# Patient Record
Sex: MALE | Race: WHITE | URBAN - METROPOLITAN AREA
[De-identification: names, ages, dates, MRNs, and addresses within clinical notes are randomized per-mention and may not be internally consistent; named-entity substitution may affect disease eponyms.]

---

## 2020-12-31 ENCOUNTER — HISTORICAL (OUTPATIENT)
Dept: ADMINISTRATIVE | Facility: HOSPITAL | Age: 25
End: 2020-12-31

## 2021-01-05 ENCOUNTER — HISTORICAL (OUTPATIENT)
Dept: ADMINISTRATIVE | Facility: HOSPITAL | Age: 26
End: 2021-01-05

## 2021-02-04 ENCOUNTER — HISTORICAL (OUTPATIENT)
Dept: ADMINISTRATIVE | Facility: HOSPITAL | Age: 26
End: 2021-02-04

## 2021-04-01 ENCOUNTER — HISTORICAL (OUTPATIENT)
Dept: ADMINISTRATIVE | Facility: HOSPITAL | Age: 26
End: 2021-04-01

## 2022-04-10 ENCOUNTER — HISTORICAL (OUTPATIENT)
Dept: ADMINISTRATIVE | Facility: HOSPITAL | Age: 27
End: 2022-04-10

## 2022-04-27 VITALS
WEIGHT: 286.63 LBS | BODY MASS INDEX: 41.03 KG/M2 | SYSTOLIC BLOOD PRESSURE: 140 MMHG | DIASTOLIC BLOOD PRESSURE: 82 MMHG | HEIGHT: 70 IN

## 2022-05-03 NOTE — HISTORICAL [facility]
This is a historical note converted from Harmeet. Formatting and pictures may have been removed.  Please reference Harmeet for original formatting and attached multimedia. Chief Complaint  7 wk f/u ORIF L & R nonop posterior column acetabulum fx 11.9.20, pt states he is doing better, requesting a refill on muscle relaxer, xr today, no other changes  History of Present Illness  ?  Here today for follow-up evaluation status post open reduction internal fixation left posterior column acetabulum fracture as well as closed treatment of right posterior wall acetabulum fracture.?Hes been doing very well, he has been compliant with his nonweightbearing status that he does state on occasion he does stand with his legs?to put on his pants?and has had no ambulation.?He has hinged elbow brace the left elbow?has been working on range of motion exercises and doing very well,?he will follow-up with Dr. Lucien Chacon?next week.  ?  Review of Systems  ?  Otherwise negative  ?  Physical Exam  Vitals & Measurements  T:?36.6? ?C (Oral)? HR:?99(Peripheral)? BP:?138/100?  HT:?177.00?cm? WT:?130.000?kg? BMI:?41.5?  ?  Bilateral lower extremity: No pain with internal/external rotation of the hips.?5 out of 5 motor strength distally?with dorsiflexion and plantar flexion of the ankle. Sensation light touch intact. No calf swelling or tenderness, no signs of DVT bilaterally. Left hip incision?clean dry and intact.  ?  Assessment/Plan  1.?Closed posterior wall fracture of right acetabulum?S32.424D  Ordered:  Clinic Follow up, *Est. 02/11/21 3:00:00 CST, Order for future visit, Closed posterior wall fracture of right acetabulum  Displaced fracture of posterior column [ilioischial] of left acetabulum, initial encounter for closed fracture, [facility]  Post-Op follow-up visit [id number] PC, Closed posterior wall fracture of right acetabulum  Displaced fracture of posterior column [ilioischial] of left acetabulum, initial encounter for closed fracture,  [facility], 12/31/20 10:14:00 CST  XR Pelvis Minimum 3 Views, Routine, *Est. 02/04/21 3:00:00 CST, Trauma, None, Ambulatory, Rad Type, Order for future visit, Closed posterior wall fracture of right acetabulum  Displaced fracture of posterior column [ilioischial] of left acetabulum, initial encounter for closed...  ?  2.?Displaced fracture of posterior column [ilioischial] of left acetabulum, initial encounter for closed fracture?S32.422D  Ordered:  Clinic Follow up, *Est. 02/11/21 3:00:00 CST, Order for future visit, Closed posterior wall fracture of right acetabulum  Displaced fracture of posterior column [ilioischial] of left acetabulum, [facility]  Post-Op follow-up visit [id number] PC, Closed posterior wall fracture of right acetabulum  Displaced fracture of posterior column [ilioischial] of left acetabulum, initial encounter for closed fracture, [facility], 12/31/20 10:14:00 CST  XR Pelvis Minimum 3 Views, Routine, *Est. 02/04/21 3:00:00 CST, Trauma, None, Ambulatory, Rad Type, Order for future visit, Closed posterior wall fracture of right acetabulum  Displaced fracture of posterior column [ilioischial] of left acetabulum, initial encounter for closed...  ?  ?  ?  He is doing very well today and Im very happy with the progress that hes made.?I will allow him to begin standing to transfer?starting in 1 week. Like to see him back in 5 weeks for repeat x-rays. He follows up with Dr. Lucien Chacon next week?for his left elbow dislocation. He understands and agrees with all we have discussed and all questions and concerns were addressed today.?Upon his return to see me in 5 weeks we will allow him?to progress to weightbearing as tolerated?and begin working on?gait training?and strengthening.  ?  ?  Referrals  Clinic Follow up, *Est. 02/11/21 3:00:00 CST, Order for future visit, Closed posterior wall fracture of right acetabulum  Displaced fracture of  posterior column [ilioischial] of left acetabulum, initial encounter for closed fracture, [facility]   Problem List/Past Medical History  Ongoing  Closed posterior wall fracture of right acetabulum  Displaced fracture of posterior column [ilioischial] of left acetabulum, initial encounter for closed fracture  Morbid obesity  Historical  No qualifying data  Procedure/Surgical History  Inspection of Lower Bone, External Approach (11/09/2020)  ORIF Acetabulum/Pelvis (Left) (11/09/2020)  Reposition Left Acetabulum with Internal Fixation Device, Open Approach (11/09/2020)   Medications  aspirin 81 mg oral Delayed Release (EC) tablet, 81 mg= 1 tab(s), Oral, BID  gabapentin 300 mg oral capsule, 600 mg= 2 cap(s), Oral, TID,? ?Not taking: Last Dose Date/Time Unknown  Norvasc 5 mg oral tablet, 5 mg= 1 tab(s), Oral, Daily  Allergies  penicillin?(Unknown)  Social History  Abuse/Neglect  No, 12/31/2020  Alcohol  Current, Beer, Liquor, 11/08/2020  Substance Use  Never, 11/08/2020  Tobacco  Never (less than 100 in lifetime), N/A, 12/31/2020  Immunizations  Vaccine Date Status Comments   influenza virus vaccine, inactivated - Not Given Patient Refuses     Health Maintenance  Health Maintenance  ???Pending?(in the next year)  ??? ??OverDue  ??? ? ? ?Influenza Vaccine due??10/01/20??and every 1??day(s)  ??? ??Due?  ??? ? ? ?Tetanus Vaccine due??12/31/20??and every 10??year(s)  ??? ??Due In Future?  ??? ? ? ?Obesity Screening not due until??01/01/21??and every 1??year(s)  ??? ? ? ?Alcohol Misuse Screening not due until??01/02/21??and every 1??year(s)  ???Satisfied?(in the past 1 year)  ??? ??Satisfied?  ??? ? ? ?ADL Screening on??12/31/20.??Satisfied by Hoang Andrews  ??? ? ? ?Alcohol Misuse Screening on??12/31/20.??Satisfied by Hoang Andrews  ??? ? ? ?Blood Pressure Screening on??12/31/20.??Satisfied by Hoang Andrews  ??? ? ? ?Body Mass Index Check on??12/31/20.??Satisfied by Hoang Andrews  Lonnie  ??? ? ? ?Depression Screening on??12/31/20.??Satisfied by Hoang Andrews  ??? ? ? ?Influenza Vaccine on??12/31/20.??Satisfied by Hoang Andrews  ??? ? ? ?Obesity Screening on??12/31/20.??Satisfied by Hoang Andrews  ?  Diagnostic Results  Pelvis 5 views:?AP and Judet views of both hips reveal?symmetric?joint space. Hardware is intact to the left acetabulum.?Evidence of callus formation noted.      16-May-2019 02:36

## 2022-05-03 NOTE — HISTORICAL [facility]
This is a historical note converted from Harmeet. Formatting and pictures may have been removed.  Please reference Harmeet for original formatting and attached multimedia. Chief Complaint  5 MONTH F/U ORIF BILAT POSTERIOR COLUMN ACETABULUM FX, NO COMPLAINTS, REQUESTING FULL WORK RELEASE  History of Present Illness  Patient is here today for a follow-up evaluation 5 months out from open reduction internal fixation of?left posterior column acetabulum fracture as well as closed treatment of right posterior wall acetabulum fracture. He states he is doing very well today. He denies any pain. He states he has returned to his pre injury activities and is not having any difficulty. His incision has been clean and free of any redness or drainage. He is requesting a release to full duty at work today. He has no complaints or issues to report. He is happy with his outcome.  Review of Systems  otherwise negative  Physical Exam  Vitals & Measurements  T:?36.9? ?C (Oral)? HR:?82(Peripheral)? RR:?20? BP:?140/82?  HT:?177.00?cm? WT:?130.000?kg? BMI:?41.5?  General: Awake, alert, oriented. Patient in no acute distress. Well nourished and well perfused. Smooth gait, no limp.  Musculoskeletal:?  right lower extremity:  Good range of motion of the hip and knee without pain  No swelling, compartment soft compressible  Calf supple and nontender without signs of DVT  Dorsi and plantar flexion intact  Brisk capillary refill distally  Sensation to light touch intact distally  ?   Left lower extremity:  Surgical incision well-healed with no signs of infection  Good range of motion of the hip and knee without pain  No swelling, compartment soft compressible  Calf supple and nontender without signs of DVT  Dorsi and plantar flexion intact  Brisk capillary refill distally  Sensation to light touch intact distally  ?  Assessment/Plan  1.?Closed posterior wall fracture of right acetabulum?S32.424D  Ordered:  Clinic Follow-up PRN, 04/01/21 14:40:00  CDT[facility]  Office/Outpatient Visit Level 2 Established [id number] PC, Closed posterior wall fracture of right acetabulum  Displaced fracture of posterior column [ilioischial] of left acetabulum, initial encounter for closed fracture, [facility], 04/01/21 14:40:00 CDT  ?  2.?Displaced fracture of posterior column [ilioischial] of left acetabulum, initial encounter for closed fracture?S32.422D  Ordered:  Clinic Follow-up PRN, 04/01/21 14:40:00 CDT, [facility], [facility]  Office/Outpatient Visit Level 2 Established [id number] PC, Closed posterior wall fracture of right acetabulum  Displaced fracture of posterior column [ilioischial] of left acetabulum, initial encounter for closed fracture, [facility], 04/01/21 14:40:00 CDT  ?  Patient is doing very well today.? Bilateral acetabulum fractures are well-healed. ?His hardware is intact with no failure or loosening.? He is returned to his preinjury level of activity and has no pain.? He was released to full duty?as requested today.? He can resume?full activity without any?restrictions. ?Will not make any plans to remove?any of his hardware in the future.? He understands that he is at risk of posttraumatic osteoarthritis to his hips?due to his injuries.?This could require future operations such as hip arthroplasty.?He does not require any further x-rays to evaluate fracture healing, so we will have him see us back on an as-needed basis for any issues or concerns. ?All questions were addressed.? Patient understands and agrees the plan of care and he is very happy with his outcome.  ?  The above findings, diagnostics, and treatment plan were discussed with Dr. Borrero who is in agreement with the plan of care.?  Referrals  Clinic Follow-up PRN, 04/01/21 14:40:00 CDT, Future Order, [facility]   Problem List/Past Medical History  Ongoing  Closed posterior wall fracture of right acetabulum  Displaced fracture of posterior column  [ilioischial] of left acetabulum, initial encounter for closed fracture  Morbid obesity  Historical  No qualifying data  Procedure/Surgical History  Inspection of Lower Bone, External Approach (11/09/2020)  ORIF Acetabulum/Pelvis (Left) (11/09/2020)  Reposition Left Acetabulum with Internal Fixation Device, Open Approach (11/09/2020)   Medications  Norvasc 5 mg oral tablet, 5 mg= 1 tab(s), Oral, Daily,? ?Not taking  Allergies  penicillin?(Unknown)  Social History  Abuse/Neglect  No, 04/01/2021  Alcohol  Current, Beer, Liquor, 11/08/2020  Substance Use  Never, 11/08/2020  Tobacco  Never (less than 100 in lifetime), N/A, 04/01/2021  Family History  Family history is negative  Immunizations  Vaccine Date Status Comments   influenza virus vaccine, inactivated - Not Given Patient Refuses     Health Maintenance  Health Maintenance  ???Pending?(in the next year)  ??? ??OverDue  ??? ? ? ?Influenza Vaccine due??10/01/20??and every 1??day(s)  ??? ? ? ?Alcohol Misuse Screening due??01/02/21??and every 1??year(s)  ??? ??Due?  ??? ? ? ?Tetanus Vaccine due??04/01/21??and every 10??year(s)  ??? ??Due In Future?  ??? ? ? ?ADL Screening not due until??12/31/21??and every 1??year(s)  ??? ? ? ?Obesity Screening not due until??01/01/22??and every 1??year(s)  ???Satisfied?(in the past 1 year)  ??? ??Satisfied?  ??? ? ? ?ADL Screening on??12/31/20.??Satisfied by Hoang Andrews  ??? ? ? ?Alcohol Misuse Screening on??12/31/20.??Satisfied by Hoang Andrews  ??? ? ? ?Blood Pressure Screening on??04/01/21.??Satisfied by Domenica Martin  ??? ? ? ?Body Mass Index Check on??04/01/21.??Satisfied by Lambert, Domenica M.  ??? ? ? ?Depression Screening on??04/01/21.??Satisfied by Domenica Martin  ??? ? ? ?Influenza Vaccine on??02/04/21.??Satisfied by Vivian Brian  ??? ? ? ?Obesity Screening on??04/01/21.??Satisfied by Domenica Martin  ?  Diagnostic Results  ap and judet views of the pelvis: bilateral acetabulum  fractures well healed; hardware intact with no failure or loosening; femoral acetabular joint spaces maintained      Patient evaluated and discussed with Viri Urbina NP. I agree with her assessment and plan of care with any exceptions or additions noted.

## 2022-05-03 NOTE — HISTORICAL [facility]
This is a historical note converted from Harmeet. Formatting and pictures may have been removed.  Please reference Harmeet for original formatting and attached multimedia. Chief Complaint  Pt here for H[facility] f/u 1 wk, L elbow dislocation. Pt states doing well, take ibuprofen for pain...NG  History of Present Illness  This is a patient that?was involved in a multitrauma?that sustained a left elbow dislocation also?left?acetabulum fracture.? He underwent closed reduction in the emergency room and was placed in a splint.? I transitioned him to a?postop elbow brace and started some range of motion.? He is doing well no major complaints at all. ?States he takes his brace off periodically at home?and uses the elbow with no issues.  Review of Systems  All review of systems negative except for those stated in the HPI  Physical Exam  Vitals & Measurements  T:?97.3? ?F (Oral)? HR:?95(Peripheral)? BP:?141/95?  HT:?177.00?cm? WT:?130.000?kg? BMI:?41.5?  General: Well-developed, well-nourished.  Neuro: Alert and oriented x 3.  Psych: Normal mood and affect.  Elbow Exam:  No obvious deformity. Range of motion is 0 to 130 degrees. Negative varus and valgus stress test. Supination and pronation to 90 degrees. Negative tenderness to palpation over the lateral epicondyle. Negative tenderness to palpation over the medial epicondyle. Negative Tinel?s test. No olecranon tenderness. 5/5 strength, normal skin appearance and palpable pulses distally. Sensibility normal.  Assessment/Plan  1.?Dislocation of left elbow?S53.105A  ?His x-rays from today demonstrate?some heterotopic ossification?but no other issues in the elbow.? He has full range of motion and good strength.? There is no signs of any instability medially or laterally.? The reason I will let him?weight-bear as tolerated through the elbow.? I told him to use pain as his guide.? He will come back to see me as needed.  Ordered:  Office/Outpatient Visit Level 3 Established [id number] PC,  Dislocation of left elbow, [facility], 01/05/21 8:13:00 CST  ?   Problem List/Past Medical History  Ongoing  Closed posterior wall fracture of right acetabulum  Displaced fracture of posterior column [ilioischial] of left acetabulum, initial encounter for closed fracture  Morbid obesity  Historical  No qualifying data  Procedure/Surgical History  Inspection of Lower Bone, External Approach (11/09/2020)  ORIF Acetabulum/Pelvis (Left) (11/09/2020)  Reposition Left Acetabulum with Internal Fixation Device, Open Approach (11/09/2020)   Medications  aspirin 81 mg oral Delayed Release (EC) tablet, 81 mg= 1 tab(s), Oral, BID  gabapentin 300 mg oral capsule, 600 mg= 2 cap(s), Oral, TID,? ?Not taking: Last Dose Date/Time Unknown  Norvasc 5 mg oral tablet, 5 mg= 1 tab(s), Oral, Daily  Allergies  penicillin?(Unknown)  Social History  Abuse/Neglect  No, 12/31/2020  Alcohol  Current, Beer, Liquor, 11/08/2020  Substance Use  Never, 11/08/2020  Tobacco  Never (less than 100 in lifetime), N/A, 12/31/2020  Immunizations  Vaccine Date Status Comments   influenza virus vaccine, inactivated - Not Given Patient Refuses     Health Maintenance  Health Maintenance  ???Pending?(in the next year)  ??? ??OverDue  ??? ? ? ?Influenza Vaccine due??10/01/20??and every 1??day(s)  ??? ??Due?  ??? ? ? ?Obesity Screening due??01/01/21??and every 1??year(s)  ??? ? ? ?Alcohol Misuse Screening due??01/02/21??and every 1??year(s)  ??? ? ? ?Tetanus Vaccine due??01/05/21??and every 10??year(s)  ??? ??Due In Future?  ??? ? ? ?Blood Pressure Screening not due until??12/31/21??and every 1??year(s)  ??? ? ? ?Body Mass Index Check not due until??12/31/21??and every 1??year(s)  ??? ? ? ?Depression Screening not due until??12/31/21??and every 1??year(s)  ??? ? ? ?ADL Screening not due until??12/31/21??and every 1??year(s)  ???Satisfied?(in the past 1 year)  ??? ??Satisfied?  ??? ? ? ?ADL Screening on??12/31/20.??Satisfied by Hoang Andrews  Lonnie  ??? ? ? ?Alcohol Misuse Screening on??12/31/20.??Satisfied by Hoang Andrews  ??? ? ? ?Blood Pressure Screening on??12/31/20.??Satisfied by Hoang Andrews  ??? ? ? ?Body Mass Index Check on??12/31/20.??Satisfied by Hoang Andrews  ??? ? ? ?Depression Screening on??12/31/20.??Satisfied by Hoang Andrews  ??? ? ? ?Influenza Vaccine on??12/31/20.??Satisfied by Hoang Andrews  ??? ? ? ?Obesity Screening on??12/31/20.??Satisfied by Hoang Andrews  ?

## 2022-05-03 NOTE — HISTORICAL [facility]ERNER
This is a historical note converted from [employer]. Formatting and pictures may have been removed.  Please reference [employer] for original formatting and attached multimedia. Chief Complaint  3 months out from ORIF CLARK posterior column acetabulum fx 11/9/20-2/7/21  History of Present Illness  Patient is here today for a follow-up evaluation 3 months out from open reduction internal fixation of?left posterior column acetabulum fracture as well as closed treatment of right posterior wall acetabulum fracture. ?He states he is doing well today.? He has had minimal discomfort. ?He states that he has been compliant with his nonweightbearing status until about 3 weeks ago. ?He states that he was feeling so good that he started ambulating short distances in his home.? He has been able to walk without any assistive device and?without pain.? He states that his?incision has been?free of any drainage and he has not had any fever.? He is happy with her progress and does not have any new complaints or other issues to report today.  Review of Systems  otherwise negative  Physical Exam  Vitals & Measurements  T:?37? ?C (Oral)? HR:?82(Peripheral)? BP:?140/82?  HT:?177.00?cm? WT:?130.000?kg? BMI:?41.5?  General: Awake, alert, oriented. Patient in no acute distress. Well nourished and well perfused.  Musculoskeletal:?  right lower extremity:  Good range of motion of the hip and knee without pain  No swelling, compartment soft compressible  Calf supple and nontender without signs of DVT  Dorsi and plantar flexion intact  Brisk capillary refill distally  Sensation to light touch intact distally  ?  Left lower extremity:  Surgical incision well-healed with no signs of infection  Good range of motion of the hip and knee without pain  No swelling, compartment soft compressible  Calf supple and nontender without signs of DVT  Dorsi and plantar flexion intact  Brisk capillary refill distally  Sensation to light touch intact  distally  Assessment/Plan  1.?Closed posterior wall fracture of right acetabulum?S32.424D  Ordered:  Clinic Follow up, *Est. 04/04/21 3:00:00 CDT, Order for future visit, Closed posterior wall fracture of right acetabulum  Displaced fracture of posterior column [ilioischial] of left acetabulum, initial encounter for closed fracture, Orthopaedics  Post-Op follow-up visit [id number] PC, Closed posterior wall fracture of right acetabulum  Displaced fracture of posterior column [ilioischial] of left acetabulum, initial encounter for closed fracture, Orthopaedics Clinic, 02/04/21 14:55:00 CST  PT/OT External Referral, 02/04/21 14:56:00 CST, Closed posterior wall fracture of right acetabulum  Displaced fracture of posterior column [ilioischial] of left acetabulum, initial encounter for closed fracture, Evaluate and Treat, 3 X Week, ***General PT Orders**  XR Pelvis Minimum 3 Views, Routine, *Est. 04/04/21 3:00:00 CDT, None, Ambulatory, Rad Type, Order for future visit, Closed posterior wall fracture of right acetabulum  Displaced fracture of posterior column [ilioischial] of left acetabulum, initial encounter for closed fractur...  ?  2.?Displaced fracture of posterior column [ilioischial] of left acetabulum, initial encounter for closed fracture?S32.422D  Ordered:  Clinic Follow up, *Est. 04/04/21 3:00:00 CDT, Order for future visit, Closed posterior wall fracture of right acetabulum  Displaced fracture of posterior column [ilioischial] of left acetabulum, initial encounter for closed fracture, Orthopaedics  Post-Op follow-up visit [id number] PC, Closed posterior wall fracture of right acetabulum  Displaced fracture of posterior column [ilioischial] of left acetabulum, initial encounter for closed fracture, Orthopaedics Clinic, 02/04/21 14:55:00 CST  PT/OT External Referral, 02/04/21 14:56:00 CST, Closed posterior wall fracture of right acetabulum  Displaced fracture of posterior column [ilioischial] of left  acetabulum, initial encounter for closed fracture, Evaluate and Treat, 3 X Week, ***General PT Orders**  XR Pelvis Minimum 3 Views, Routine, *Est. 04/04/21 3:00:00 CDT, None, Ambulatory, Rad Type, Order for future visit, Closed posterior wall fracture of right acetabulum  Displaced fracture of posterior column [ilioischial] of left acetabulum, initial encounter for closed fractur...  ?  Pt Is doing well today 3 months out from his injury.? His x-rays look good. ?His right acetabulum fracture is amenable to continue nonoperative treatment. ?His left acetabular fracture is healing appropriately with no signs of hardware failure.? At this point, we will formally released him to weight-bear as tolerated?for ambulation, which he has been doing on his own for the past 3 weeks. Ok for full ROM to bilateral lower extremities.?He was provided with orders for physical therapy today for gait training, stretching, range of motion, and strengthening exercises.? 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 He may be ready to return to work in some capacity?at his next visit. ?We will plan to see him back in 2 months?for repeat x-rays and evaluation. ?All questions concerns were addressed. ?Patient is happy with his progress and he understands and agrees with the plan of care.  ?  The above findings, diagnostics, and treatment plan were discussed with Dr. Borrero who is in agreement with the plan of care.?  Referrals  Clinic Follow up, *Est. 04/04/21 3:00:00 CDT, Order for future visit, Closed posterior wall fracture of right acetabulum  Displaced fracture of posterior column [ilioischial] of left acetabulum, initial encounter for closed fracture, [facility]  PT/OT External Referral, 02/04/21 14:56:00 CST, Closed posterior wall fracture of right acetabulum  Displaced fracture of posterior column [ilioischial] of left acetabulum,  initial encounter for closed fracture, Evaluate and Treat, 3 X Week, ***General PT Orders**   Problem List/Past Medical History  Ongoing  Closed posterior wall fracture of right acetabulum  Displaced fracture of posterior column [ilioischial] of left acetabulum, initial encounter for closed fracture  Morbid obesity  Historical  No qualifying data  Procedure/Surgical History  Inspection of Lower Bone, External Approach (11/09/2020)  ORIF Acetabulum/Pelvis (Left) (11/09/2020)  Reposition Left Acetabulum with Internal Fixation Device, Open Approach (11/09/2020)   Medications  aspirin 81 mg oral Delayed Release (EC) tablet, 81 mg= 1 tab(s), Oral, BID,? ?Not taking  gabapentin 300 mg oral capsule, 600 mg= 2 cap(s), Oral, TID,? ?Not taking: Last Dose Date/Time Unknown  Norvasc 5 mg oral tablet, 5 mg= 1 tab(s), Oral, Daily,? ?Not taking  Allergies  penicillin?(Unknown)  Social History  Abuse/Neglect  No, 01/05/2021  Alcohol  Current, Beer, Liquor, 11/08/2020  Substance Use  Never, 11/08/2020  Tobacco  Never (less than 100 in lifetime), N/A, 01/05/2021  Immunizations  Vaccine Date Status Comments   influenza virus vaccine, inactivated - Not Given Patient Refuses     Health Maintenance  Health Maintenance  ???Pending?(in the next year)  ??? ??OverDue  ??? ? ? ?Influenza Vaccine due??10/01/20??and every 1??day(s)  ??? ??Due?  ??? ? ? ?Alcohol Misuse Screening due??01/02/21??and every 1??year(s)  ??? ? ? ?Tetanus Vaccine due??02/04/21??and every 10??year(s)  ??? ??Due In Future?  ??? ? ? ?ADL Screening not due until??12/31/21??and every 1??year(s)  ??? ? ? ?Obesity Screening not due until??01/01/22??and every 1??year(s)  ???Satisfied?(in the past 1 year)  ??? ??Satisfied?  ??? ? ? ?ADL Screening on??12/31/20.??Satisfied by Hoang Andrews  ??? ? ? ?Alcohol Misuse Screening on??12/31/20.??Satisfied by Hoang Andrews  ??? ? ? ?Blood Pressure Screening on??02/04/21.??Satisfied by Vivian Brian  ??? ? ?  ?Body Mass Index Check on??02/04/21.??Satisfied by Vivian Brian  ??? ? ? ?Depression Screening on??02/04/21.??Satisfied by Vivian Brian  ??? ? ? ?Influenza Vaccine on??02/04/21.??Satisfied by Vivian Brian  ??? ? ? ?Obesity Screening on??02/04/21.??Satisfied by Vivian Brian  ?  Diagnostic Results  X-rays of the pelvis demonstrate?stable alignment of the right posterior wall acetabulum fracture with interval bone healing; Alignment amenable to continue nonoperative treatment; Concentric hip reduction on the right; Femoral acetabular joint spaces?contained on the right.? Stable alignment of the?left acetabulum fracture with no changes compared to previous films; No hardware failure?or loosening on the left; concentric hip reduction on the left; femoral acetabular joint space maintained on the left.      Patient evaluated and discussed with Viri Urbina NP. I agree with her assessment and plan of care with any exceptions or additions noted.